# Patient Record
(demographics unavailable — no encounter records)

---

## 2024-11-06 NOTE — PHYSICAL EXAM
[No Acute Distress] : no acute distress [Well Nourished] : well nourished [Well Developed] : well developed [Well-Appearing] : well-appearing [Normal Voice/Communication] : normal voice/communication [Normal Sclera/Conjunctiva] : normal sclera/conjunctiva [Normal Outer Ear/Nose] : the outer ears and nose were normal in appearance [No JVD] : no jugular venous distention [No Respiratory Distress] : no respiratory distress  [No Accessory Muscle Use] : no accessory muscle use [Clear to Auscultation] : lungs were clear to auscultation bilaterally [Normal Rate] : normal rate  [Regular Rhythm] : with a regular rhythm [Normal S1, S2] : normal S1 and S2 [No Murmur] : no murmur heard [No Edema] : there was no peripheral edema [Soft] : abdomen soft [Non Tender] : non-tender [Non-distended] : non-distended [No Masses] : no abdominal mass palpated [No HSM] : no HSM [Normal Bowel Sounds] : normal bowel sounds [No Spinal Tenderness] : no spinal tenderness [Grossly Normal Strength/Tone] : grossly normal strength/tone [No Rash] : no rash [Coordination Grossly Intact] : coordination grossly intact [No Focal Deficits] : no focal deficits [Normal Gait] : normal gait [Speech Grossly Normal] : speech grossly normal [Normal Affect] : the affect was normal [Alert and Oriented x3] : oriented to person, place, and time [Normal Mood] : the mood was normal [Normal Insight/Judgement] : insight and judgment were intact [de-identified] : hypertonic paraspinal lower back muscles

## 2024-11-06 NOTE — HISTORY OF PRESENT ILLNESS
[___ Days ago] : [unfilled] days ago [Constant] : constant [Severe] : severe [Heat] : heat [Activity] : with activity [Worsening] : worsening [de-identified] : lower back pain [FreeTextEntry8] : lower back pain chronic usually tolerates - with advil - once in a while  needs muscle relaxor ,  tylenol and motrin not helping pain worsening  no radiating pain

## 2025-01-24 NOTE — HISTORY OF PRESENT ILLNESS
[Improved Health] : Improved health [Quality of Life] : Quality of life [Improved Mobility] : Improved mobility [Cut/Track Calories] : cut/track calories [FreeTextEntry2] : 150 @ 2020 [FreeTextEntry3] : 190 [FreeTextEntry1] : THONY ANN is a 36 year year old who comes in for a dietary/weight loss consultation. Past medical history is significant for obesity, hypercholesterolemia, depression, sickle cell trait . Patient's vital signs were reviewed. Her reported height is 5'2 Today's weight is 185 . BMI is 34.39. A detailed weight history was obtained.   The patient has tried numerous weight loss programs including self-directed and physician supervised diets and self-directed exercise programs. She has lost greater than 10 pounds on more than one occasion, however, has experienced weight regain despite her best efforts with healthy diet and exercise.  LABS - DATE: 7/2024  HgA1C: 4.7%   DIETING HISTORY Prior Diets: cutting calories, weight watchers History of Bariatric Surgery: no  If yes, what Bariatric Surgery: Interest in Bariatric surgery: No    History of Weight Loss Medications: None If yes, what Medications: Complications & Side Effects of Anti-Obesity Medications:   LIFESTYLE: Contraception: uptodate Care, no conception, abstinence Sleep: limited sleep ( son w/ sickle cell disease) Alcohol: wine, bottle, a few days a week. Exercise: limited Occupation: RN,    NUTRITION: Breakfast: sandwich, boiled egg, instant oatmeal w/ flaxseed and cinnamon, fruit Lunch: 1230-1pm: fastfood,but decreased, leftovers from dinner  Snack: fruit, cheese, yogurt, cracker.  Dinner: 6pm :  salad, Steaks, chicken, shrimp, 2x week pasta  (2cup) Snacks: late night snacking chips, ice cream, cookie Drinks: cutting down on soda, seltzer, 2-3 coffee w/ cream, flavored syrup,   I have instructed the patient to follow a 1200 calories meal plan emphasizing low saturated fat sources with moderate amount of sodium as well. Information on fast food eating was supplied and additional information on low fat eating was also supplied. Suggestions of meals and snacks were discussed with the patient in great detail. We reviewed the efforts of weight reduction identifying 3500 calories in pound of body fat and the need to gradually and sloqly chip away at this number on a long term basis for weight reduction. It is a lifestyle change. WE discussed the need to reduce calories for what her current patterns are and to hopefully increase physical activity as well.    Patient admits to eating quickly, skipping meals, and poor food choices. Discussed the importance of a balanced, healthy diet of nourishing foods and consistent meal pattern for long-term wt loss success and health maintenance. Pt educated on eating 4-6 small meals per day, that are high in protein for hunger regulation. We discussed the importance of decreasing alcohol intake.    Her physical activity is minimal at this time. Recommendations given to the patient to increase the intensity and the duration of her physical activity with the goal of 30mins five times a week. to starty with brisk walking. Recommend the patient to reduce calories by 500 daily to support a weight loss of 1 pound a week. This translated into a 1200 calorie plan. I encouraged the patient to keep food records in order to better track calorie consumed. I recommended low fat selections and especially those that are lower in saturated fats. Emphasis would be placed on monitoring portions of meat and having more moderate snacks between meal as well.     Will try Course of :  Labs reviewed Increased protein in breakfast and meals, Whole food- increased vegtables Discussed Alternatives of syrup in coffee, to decrease sugar intake.  Increase Hydation zepbound Rx sent. Risks include but are not limited to nausea, vomiting, constipation, diarrhea, and Gi upset. Contraindications include Pancreatitis, MENS type 2 and medullary thyroid cancer, and pregnancy. Pt. verbalize understanding and wishes to proceed with medical therapy. Instructions for use provided via office demonstration. Discussed shortage and insurance limitations.Discussed rx going to [facility] for prior authorization.  Next month zepbound 5mg.  Plan for followup in 2 months

## 2025-01-24 NOTE — HISTORY OF PRESENT ILLNESS
[Improved Health] : Improved health [Quality of Life] : Quality of life [Improved Mobility] : Improved mobility [Cut/Track Calories] : cut/track calories [FreeTextEntry2] : 150 @ 2020 [FreeTextEntry3] : 190 [FreeTextEntry1] : THONY ANN is a 36 year year old who comes in for a dietary/weight loss consultation. Past medical history is significant for obesity, hypercholesterolemia, depression, sickle cell trait . Patient's vital signs were reviewed. Her reported height is 5'2 Today's weight is 185 . BMI is 34.39. A detailed weight history was obtained.   The patient has tried numerous weight loss programs including self-directed and physician supervised diets and self-directed exercise programs. She has lost greater than 10 pounds on more than one occasion, however, has experienced weight regain despite her best efforts with healthy diet and exercise.  LABS - DATE: 7/2024  HgA1C: 4.7%   DIETING HISTORY Prior Diets: cutting calories, weight watchers History of Bariatric Surgery: no  If yes, what Bariatric Surgery: Interest in Bariatric surgery: No    History of Weight Loss Medications: None If yes, what Medications: Complications & Side Effects of Anti-Obesity Medications:   LIFESTYLE: Contraception: uptodate Care, no conception, abstinence Sleep: limited sleep ( son w/ sickle cell disease) Alcohol: wine, bottle, a few days a week. Exercise: limited Occupation: RN,    NUTRITION: Breakfast: sandwich, boiled egg, instant oatmeal w/ flaxseed and cinnamon, fruit Lunch: 1230-1pm: fastfood,but decreased, leftovers from dinner  Snack: fruit, cheese, yogurt, cracker.  Dinner: 6pm :  salad, Steaks, chicken, shrimp, 2x week pasta  (2cup) Snacks: late night snacking chips, ice cream, cookie Drinks: cutting down on soda, seltzer, 2-3 coffee w/ cream, flavored syrup,   I have instructed the patient to follow a 1200 calories meal plan emphasizing low saturated fat sources with moderate amount of sodium as well. Information on fast food eating was supplied and additional information on low fat eating was also supplied. Suggestions of meals and snacks were discussed with the patient in great detail. We reviewed the efforts of weight reduction identifying 3500 calories in pound of body fat and the need to gradually and sloqly chip away at this number on a long term basis for weight reduction. It is a lifestyle change. WE discussed the need to reduce calories for what her current patterns are and to hopefully increase physical activity as well.    Patient admits to eating quickly, skipping meals, and poor food choices. Discussed the importance of a balanced, healthy diet of nourishing foods and consistent meal pattern for long-term wt loss success and health maintenance. Pt educated on eating 4-6 small meals per day, that are high in protein for hunger regulation. We discussed the importance of decreasing alcohol intake.    Her physical activity is minimal at this time. Recommendations given to the patient to increase the intensity and the duration of her physical activity with the goal of 30mins five times a week. to starty with brisk walking. Recommend the patient to reduce calories by 500 daily to support a weight loss of 1 pound a week. This translated into a 1200 calorie plan. I encouraged the patient to keep food records in order to better track calorie consumed. I recommended low fat selections and especially those that are lower in saturated fats. Emphasis would be placed on monitoring portions of meat and having more moderate snacks between meal as well.     Will try Course of :  Labs reviewed Increased protein in breakfast and meals, Whole food- increased vegtables Discussed Alternatives of syrup in coffee, to decrease sugar intake.  Increase Hydation zepbound Rx sent. Risks include but are not limited to nausea, vomiting, constipation, diarrhea, and Gi upset. Contraindications include Pancreatitis, MENS type 2 and medullary thyroid cancer, and pregnancy. Pt. verbalize understanding and wishes to proceed with medical therapy. Instructions for use provided via office demonstration. Discussed shortage and insurance limitations.Discussed rx going to [facility] for prior authorization.  Next month zepbound 5mg.  Plan for followup in 2 months

## 2025-01-24 NOTE — REASON FOR VISIT
[Home] : at home, [unfilled] , at the time of the visit. [Medical Office: ([facility] [place])___] : at the medical office located in  [Patient] : the patient [Initial Evaluation] : an initial evaluation

## 2025-01-24 NOTE — REASON FOR VISIT
[Home] : at home, [unfilled] , at the time of the visit. [Medical Office: ([facility])___] : at the medical office located in  [Patient] : the patient [Initial Evaluation] : an initial evaluation

## 2025-02-14 NOTE — ASSESSMENT
[FreeTextEntry1] : THONY ANN  is here for an appointment for a followup of weight loss medications. Recently  Started zepbound. Patient part of [facility] for insurance.    Goal Weight:  Patient Current weight 188--->181 BMI: 33.11   NUTRITION:logging in food, cut down on wine Breakfast: sandwich, egg white bites, coffee w/ splash milk Lunch: 1230-1pm: salads, lighter on dressing, w/ avocado Snack: fruit, cheese, yogurt, cracker. Dinner: 6pm : salad, Steaks, chicken, shrimp, cut out pasta Snacks: decreased Drinks: cutting down on soda, seltzer, 2-3 coffee w/ cream, flavored syrup,  Physical activity-  limited Medication compliance- good   Plan of Care:  Plan for updated labs next visit. Whole food eating based strategy.  stressed Meal prep/food journaling Stressed increase in physical activity Increase to zepbound 5mg x 3months.

## 2025-02-14 NOTE — REASON FOR VISIT
[Home] : at home, [unfilled] , at the time of the visit. [Medical Office: ([facility])___] : at the medical office located in  [Telehealth (audio & video)] : This visit was provided via telehealth using real-time 2-way audio visual technology. [Verbal consent obtained from patient] : the patient, [unfilled] [Follow-Up] : a follow-up visit

## 2025-05-16 NOTE — ASSESSMENT
[FreeTextEntry1] : THONY ANN  is here for an appointment for a followup of weight loss medications. Recently  Started zepbound. Patient part of [facility] for insurance.    Goal Weight:  Weight lost: 22 pounds Patient Current weight 188--->181-->166 BMI: 33.11-->30.36   NUTRITION:logging in food, cut down on wine Breakfast: sandwich, egg white bites, coffee w/ splash milk, yogurt w/ fruit Lunch: 1230-1pm: salads, lighter on dressing, w/ avocado, avocado toast Snack: fruit, cheese, yogurt, cracker. Dinner: 6pm : salad, Steaks, chicken, shrimp, cut out pasta, chiptole Snacks: decreased Drinks: cutting down on soda, seltzer, 2-3 coffee w/ cream, flavored syrup,  Physical activity-  walking,  Medication compliance- good   Plan of Care:  Discussed getting blood work- to f/u with PMD Whole food eating based strategy- protein intake removed.  Stressed increase in physical activity continue zepbound 7.5mg x 2 months.

## 2025-05-16 NOTE — REASON FOR VISIT
[Home] : at home, [unfilled] , at the time of the visit. [Medical Office: ([facility])___] : at the medical office located in  [Telehealth (audio & video)] : This visit was provided via telehealth using real-time 2-way audio visual technology. [Verbal consent obtained from patient] : the patient, [unfilled] [Follow-Up] : a follow-up visit